# Patient Record
Sex: FEMALE | Race: WHITE | Employment: OTHER | URBAN - METROPOLITAN AREA
[De-identification: names, ages, dates, MRNs, and addresses within clinical notes are randomized per-mention and may not be internally consistent; named-entity substitution may affect disease eponyms.]

---

## 2018-12-19 ENCOUNTER — HOSPITAL ENCOUNTER (OUTPATIENT)
Dept: BONE DENSITY | Age: 68
Discharge: HOME | End: 2018-12-19
Attending: FAMILY MEDICINE
Payer: MEDICARE

## 2018-12-19 DIAGNOSIS — M81.0 OSTEOPOROSIS: ICD-10-CM

## 2018-12-19 PROCEDURE — 77080 DXA BONE DENSITY AXIAL: CPT

## 2019-04-15 ENCOUNTER — HOSPITAL ENCOUNTER (OUTPATIENT)
Dept: ULTRASOUND IMAGING | Age: 69
Discharge: HOME | End: 2019-04-15
Attending: FAMILY MEDICINE
Payer: MEDICARE

## 2019-04-15 DIAGNOSIS — R10.13 ABDOMINAL PAIN, EPIGASTRIC: ICD-10-CM

## 2019-04-15 PROCEDURE — 76700 US EXAM ABDOM COMPLETE: CPT

## 2019-04-23 ENCOUNTER — HOSPITAL ENCOUNTER (OUTPATIENT)
Dept: CT IMAGING | Age: 69
Discharge: HOME | End: 2019-04-23
Attending: FAMILY MEDICINE
Payer: MEDICARE

## 2019-04-23 DIAGNOSIS — K86.2 CYST OF PANCREAS: ICD-10-CM

## 2019-04-23 LAB — CREAT BLD-MCNC: 0.6 MG/DL (ref 0.6–1.3)

## 2019-04-23 PROCEDURE — 82565 ASSAY OF CREATININE: CPT

## 2019-04-23 PROCEDURE — 74170 CT ABD WO CNTRST FLWD CNTRST: CPT

## 2019-04-23 PROCEDURE — 74011636320 HC RX REV CODE- 636/320: Performed by: INTERNAL MEDICINE

## 2019-04-23 RX ORDER — SODIUM CHLORIDE 0.9 % (FLUSH) 0.9 %
10 SYRINGE (ML) INJECTION
Status: COMPLETED | OUTPATIENT
Start: 2019-04-23 | End: 2019-04-23

## 2019-04-23 RX ADMIN — IOPAMIDOL 100 ML: 755 INJECTION, SOLUTION INTRAVENOUS at 11:09

## 2019-04-23 RX ADMIN — Medication 10 ML: at 11:09

## 2019-05-28 ENCOUNTER — HOSPITAL ENCOUNTER (OUTPATIENT)
Dept: MRI IMAGING | Age: 69
Discharge: HOME | End: 2019-05-28
Attending: INTERNAL MEDICINE
Payer: MEDICARE

## 2019-05-28 DIAGNOSIS — K86.2 PANCREATIC CYST: ICD-10-CM

## 2019-05-28 PROCEDURE — A9585 GADOBUTROL INJECTION: HCPCS | Performed by: INTERNAL MEDICINE

## 2019-05-28 PROCEDURE — 74011250636 HC RX REV CODE- 250/636: Performed by: INTERNAL MEDICINE

## 2019-05-28 PROCEDURE — 74183 MRI ABD W/O CNTR FLWD CNTR: CPT

## 2019-05-28 RX ADMIN — GADOBUTROL 10 ML: 604.72 INJECTION INTRAVENOUS at 15:46

## 2020-05-24 ENCOUNTER — HOSPITAL ENCOUNTER (OUTPATIENT)
Dept: MRI IMAGING | Age: 70
Discharge: HOME | End: 2020-05-24
Attending: INTERNAL MEDICINE
Payer: MEDICARE

## 2020-05-24 DIAGNOSIS — K86.2 PANCREATIC CYST: ICD-10-CM

## 2020-05-24 PROCEDURE — A9585 GADOBUTROL INJECTION: HCPCS | Performed by: INTERNAL MEDICINE

## 2020-05-24 PROCEDURE — 74183 MRI ABD W/O CNTR FLWD CNTR: CPT

## 2020-05-24 PROCEDURE — 74011250636 HC RX REV CODE- 250/636: Performed by: INTERNAL MEDICINE

## 2020-05-24 RX ADMIN — GADOBUTROL 9 ML: 604.72 INJECTION INTRAVENOUS at 09:38

## 2020-12-10 ENCOUNTER — TRANSCRIBE ORDER (OUTPATIENT)
Dept: SCHEDULING | Age: 70
End: 2020-12-10

## 2020-12-10 DIAGNOSIS — M81.0 OSTEOPOROSIS: Primary | ICD-10-CM

## 2021-02-15 ENCOUNTER — HOSPITAL ENCOUNTER (OUTPATIENT)
Dept: BONE DENSITY | Age: 71
Discharge: HOME | End: 2021-02-15
Attending: FAMILY MEDICINE
Payer: MEDICARE

## 2021-02-15 DIAGNOSIS — M81.0 OSTEOPOROSIS: ICD-10-CM

## 2021-02-15 PROCEDURE — 77080 DXA BONE DENSITY AXIAL: CPT

## 2021-03-10 RX ORDER — ACETAMINOPHEN 325 MG/1
650 TABLET ORAL ONCE
Status: ACTIVE | OUTPATIENT
Start: 2021-03-17 | End: 2021-03-17

## 2021-03-10 RX ORDER — ZOLEDRONIC ACID 5 MG/100ML
5 INJECTION, SOLUTION INTRAVENOUS ONCE
Status: COMPLETED | OUTPATIENT
Start: 2021-03-17 | End: 2021-03-17

## 2021-03-17 ENCOUNTER — HOSPITAL ENCOUNTER (OUTPATIENT)
Dept: INFUSION THERAPY | Age: 71
Discharge: HOME | End: 2021-03-17
Payer: MEDICARE

## 2021-03-17 VITALS
DIASTOLIC BLOOD PRESSURE: 88 MMHG | OXYGEN SATURATION: 97 % | BODY MASS INDEX: 36.47 KG/M2 | SYSTOLIC BLOOD PRESSURE: 146 MMHG | TEMPERATURE: 97.2 F | WEIGHT: 202.6 LBS | RESPIRATION RATE: 16 BRPM | HEART RATE: 71 BPM

## 2021-03-17 LAB
ALBUMIN SERPL-MCNC: 3.6 G/DL (ref 3.5–5)
ANION GAP BLD CALC-SCNC: 14 MMOL/L (ref 10–20)
ANION GAP SERPL CALC-SCNC: 5 MMOL/L (ref 5–15)
BUN BLD-MCNC: 12 MG/DL (ref 9–20)
BUN SERPL-MCNC: 12 MG/DL (ref 6–20)
BUN/CREAT SERPL: 18 (ref 12–20)
CA-I BLD-MCNC: 1.1 MMOL/L (ref 1.12–1.32)
CALCIUM SERPL-MCNC: 8.7 MG/DL (ref 8.5–10.1)
CHLORIDE BLD-SCNC: 103 MMOL/L (ref 98–107)
CHLORIDE SERPL-SCNC: 105 MMOL/L (ref 97–108)
CO2 BLD-SCNC: 27 MMOL/L (ref 21–32)
CO2 SERPL-SCNC: 27 MMOL/L (ref 21–32)
CREAT BLD-MCNC: 0.7 MG/DL (ref 0.6–1.3)
CREAT SERPL-MCNC: 0.67 MG/DL (ref 0.55–1.02)
GLUCOSE BLD-MCNC: 115 MG/DL (ref 65–100)
GLUCOSE SERPL-MCNC: 112 MG/DL (ref 65–100)
HCT VFR BLD CALC: 40 % (ref 35–47)
PHOSPHATE SERPL-MCNC: 3.5 MG/DL (ref 2.6–4.7)
POTASSIUM BLD-SCNC: 4.1 MMOL/L (ref 3.5–5.1)
POTASSIUM SERPL-SCNC: 3.8 MMOL/L (ref 3.5–5.1)
SERVICE CMNT-IMP: ABNORMAL
SODIUM BLD-SCNC: 139 MMOL/L (ref 136–145)
SODIUM SERPL-SCNC: 137 MMOL/L (ref 136–145)

## 2021-03-17 PROCEDURE — 36415 COLL VENOUS BLD VENIPUNCTURE: CPT

## 2021-03-17 PROCEDURE — 74011250636 HC RX REV CODE- 250/636: Performed by: FAMILY MEDICINE

## 2021-03-17 PROCEDURE — 80047 BASIC METABLC PNL IONIZED CA: CPT

## 2021-03-17 PROCEDURE — 80069 RENAL FUNCTION PANEL: CPT

## 2021-03-17 PROCEDURE — 96374 THER/PROPH/DIAG INJ IV PUSH: CPT

## 2021-03-17 RX ADMIN — ZOLEDRONIC ACID 5 MG: 5 INJECTION, SOLUTION INTRAVENOUS at 15:58

## 2021-03-17 NOTE — PROGRESS NOTES
[facility] Short Visit Progress Note    1430 Patient admitted to [facility] for [address] ambulatory in stable condition. Assessment completed. No new concerns voiced. Covid Screening      1. Do you have any symptoms of COVID-19? SOB, coughing, fever, or generally not feeling well ? NO  2. Have you been exposed to COVID-19 recently? NO  3. Have you had any recent contact with family/friend that has a pending COVID test? NO    Vital Signs:  Patient Vitals for the past 12 hrs:   Temp Pulse Resp BP SpO2   03/17/21 1438 97.2 °F (36.2 °C) 71 16 (!) 146/88 97 %         Peripheral IV 03/17/21 Right Antecubital (Active)   Site Assessment Clean, dry, & intact 03/17/21 1430   Phlebitis Assessment 0 03/17/21 1430   Infiltration Assessment 0 03/17/21 1430   Dressing Status New 03/17/21 1430   Dressing Type Transparent 03/17/21 1430   Hub Color/Line Status Yellow; Flushed; Infusing 03/17/21 1430   Action Taken Blood drawn 03/17/21 1430   Alcohol Cap Used Yes 03/17/21 1430        Lab Results:  Recent Results (from the past 12 hour(s))   POC CHEM8    Collection Time: 03/17/21  2:52 PM   Result Value Ref Range    Calcium, ionized (POC) 1.10 (L) 1.12 - 1.32 mmol/L    Sodium (POC) 139 136 - 145 mmol/L    Potassium (POC) 4.1 3.5 - 5.1 mmol/L    Chloride (POC) 103 98 - 107 mmol/L    CO2 (POC) 27 21 - 32 mmol/L    Anion gap (POC) 14 10 - 20 mmol/L    Glucose (POC) 115 (H) 65 - 100 mg/dL    BUN (POC) 12 9 - 20 mg/dL    Creatinine (POC) 0.7 0.6 - 1.3 mg/dL    GFRAA, POC >60 >60 ml/min/1.73m2    GFRNA, POC >60 >60 ml/min/1.73m2    Hematocrit (POC) 40 35.0 - 47.0 %    Comment Comment Not Indicated.      RENAL FUNCTION PANEL    Collection Time: 03/17/21  2:59 PM   Result Value Ref Range    Sodium 137 136 - 145 mmol/L    Potassium 3.8 3.5 - 5.1 mmol/L    Chloride 105 97 - 108 mmol/L    CO2 27 21 - 32 mmol/L    Anion gap 5 5 - 15 mmol/L    Glucose 112 (H) 65 - 100 mg/dL    BUN 12 6 - 20 MG/DL    Creatinine 0.67 0.55 - 1.02 MG/DL BUN/Creatinine ratio 18 12 - 20      GFR est AA >60 >60 ml/min/1.73m2    GFR est non-AA >60 >60 ml/min/1.73m2    Calcium 8.7 8.5 - 10.1 MG/DL    Phosphorus 3.5 2.6 - 4.7 MG/DL    Albumin 3.6 3.5 - 5.0 g/dL     Patient's labs within parameters for treatment. Medications:  Medications Administered     zoledronic acid (RECLAST) IVPB 5 mg     Admin Date  03/17/2021 Action  Given Dose  5 mg Rate  400 mL/hr Route  IntraVENous Administered By  Dariela Cotton              Patient's labs within parameters for treatment. Patient given educations on medication. Patient opted to take Tylenol when home. Patient tolerated treatment well. Patient discharged from [facility] ambulatory in no distress at 1630. Patient aware of next appointment. No future appointments.

## 2021-06-10 ENCOUNTER — TRANSCRIBE ORDER (OUTPATIENT)
Dept: SCHEDULING | Age: 71
End: 2021-06-10

## 2021-06-10 DIAGNOSIS — K86.2 PANCREATIC CYST: Primary | ICD-10-CM

## 2021-06-26 ENCOUNTER — HOSPITAL ENCOUNTER (OUTPATIENT)
Dept: MRI IMAGING | Age: 71
Discharge: HOME | End: 2021-06-26
Attending: INTERNAL MEDICINE
Payer: MEDICARE

## 2021-06-26 DIAGNOSIS — K86.2 PANCREATIC CYST: ICD-10-CM

## 2021-06-26 PROCEDURE — 74181 MRI ABDOMEN W/O CONTRAST: CPT

## 2023-05-22 RX ORDER — LORAZEPAM 1 MG/1
0.5 TABLET ORAL 2 TIMES DAILY PRN
COMMUNITY

## 2023-05-22 RX ORDER — ACETAMINOPHEN 325 MG/1
650 TABLET ORAL EVERY 6 HOURS PRN
COMMUNITY
Start: 2015-05-05

## 2023-05-22 RX ORDER — LORATADINE 10 MG/1
10 TABLET ORAL DAILY
COMMUNITY
Start: 2015-05-06

## 2023-05-22 RX ORDER — FLUOXETINE HYDROCHLORIDE 40 MG/1
40 CAPSULE ORAL NIGHTLY
COMMUNITY

## 2023-05-22 RX ORDER — CYANOCOBALAMIN 1000 UG/ML
1000 INJECTION, SOLUTION INTRAMUSCULAR; SUBCUTANEOUS
COMMUNITY

## 2024-01-31 ENCOUNTER — OFFICE VISIT (OUTPATIENT)
Age: 74
End: 2024-01-31
Payer: MEDICARE

## 2024-01-31 VITALS
DIASTOLIC BLOOD PRESSURE: 80 MMHG | SYSTOLIC BLOOD PRESSURE: 130 MMHG | HEART RATE: 66 BPM | TEMPERATURE: 97.5 F | RESPIRATION RATE: 16 BRPM | BODY MASS INDEX: 37.06 KG/M2 | OXYGEN SATURATION: 98 % | HEIGHT: 62 IN

## 2024-01-31 DIAGNOSIS — R41.3 MEMORY DISTURBANCE: Primary | ICD-10-CM

## 2024-01-31 DIAGNOSIS — R41.3 MEMORY DISTURBANCE: ICD-10-CM

## 2024-01-31 PROCEDURE — 1090F PRES/ABSN URINE INCON ASSESS: CPT | Performed by: PSYCHIATRY & NEUROLOGY

## 2024-01-31 PROCEDURE — G8427 DOCREV CUR MEDS BY ELIG CLIN: HCPCS | Performed by: PSYCHIATRY & NEUROLOGY

## 2024-01-31 PROCEDURE — G8399 PT W/DXA RESULTS DOCUMENT: HCPCS | Performed by: PSYCHIATRY & NEUROLOGY

## 2024-01-31 PROCEDURE — G8417 CALC BMI ABV UP PARAM F/U: HCPCS | Performed by: PSYCHIATRY & NEUROLOGY

## 2024-01-31 PROCEDURE — 1123F ACP DISCUSS/DSCN MKR DOCD: CPT | Performed by: PSYCHIATRY & NEUROLOGY

## 2024-01-31 PROCEDURE — G8484 FLU IMMUNIZE NO ADMIN: HCPCS | Performed by: PSYCHIATRY & NEUROLOGY

## 2024-01-31 PROCEDURE — 99205 OFFICE O/P NEW HI 60 MIN: CPT | Performed by: PSYCHIATRY & NEUROLOGY

## 2024-01-31 PROCEDURE — 1036F TOBACCO NON-USER: CPT | Performed by: PSYCHIATRY & NEUROLOGY

## 2024-01-31 RX ORDER — MECLIZINE HYDROCHLORIDE 25 MG/1
25 TABLET ORAL 3 TIMES DAILY PRN
COMMUNITY

## 2024-01-31 RX ORDER — BUPROPION HYDROCHLORIDE 150 MG/1
TABLET ORAL
COMMUNITY
Start: 2024-01-08

## 2024-01-31 RX ORDER — ZOLPIDEM TARTRATE 5 MG/1
TABLET ORAL
COMMUNITY
Start: 2024-01-14

## 2024-01-31 RX ORDER — DONEPEZIL HYDROCHLORIDE 10 MG/1
10 TABLET, FILM COATED ORAL NIGHTLY
COMMUNITY

## 2024-01-31 RX ORDER — MEMANTINE HYDROCHLORIDE 10 MG/1
10 TABLET ORAL 2 TIMES DAILY
COMMUNITY
Start: 2024-01-04

## 2024-01-31 RX ORDER — BUSPIRONE HYDROCHLORIDE 10 MG/1
10 TABLET ORAL 2 TIMES DAILY
COMMUNITY

## 2024-01-31 ASSESSMENT — PATIENT HEALTH QUESTIONNAIRE - PHQ9
SUM OF ALL RESPONSES TO PHQ QUESTIONS 1-9: 0
SUM OF ALL RESPONSES TO PHQ9 QUESTIONS 1 & 2: 0
SUM OF ALL RESPONSES TO PHQ QUESTIONS 1-9: 0
1. LITTLE INTEREST OR PLEASURE IN DOING THINGS: 0
SUM OF ALL RESPONSES TO PHQ QUESTIONS 1-9: 0
2. FEELING DOWN, DEPRESSED OR HOPELESS: 0
SUM OF ALL RESPONSES TO PHQ QUESTIONS 1-9: 0

## 2024-01-31 NOTE — PATIENT INSTRUCTIONS
As per discussion  I agree with you being on the Aricept and the Namenda at this time we need to order some test MRI scan and blood work and if you get online with my chart I will go over those briefly with you and anything differently we need to do based on those results if you are not online I will send you a letter regarding that.    We also can get you in for the neuropsych testing with Dr. Lopez and I will see you back after everything has been completed so we can discuss the next steps                Office Policies    Phone calls/patient messages:  Please allow up to 24 hours for someone in the office to contact you about your call or message. Be mindful your provider may be out of the office or your message may require further review. We encourage you to use GoingOn for your messages as this is a faster, more efficient way to communicate with our office    Medication Refills:  Prescription medications require up to 48 business hours to process. We encourage you to use GoingOn for your refills.     For controlled medications: Please allow up to 72 business hours to process. Certain medications may require you to  a written prescription at our office.    NO narcotic/controlled medications will be prescribed after 4pm Monday through Friday or on weekends    Form/Paperwork Completion:  We ask that you allow 7-14 business days. You may also download your forms to GoingOn to have your doctor print off.

## 2024-01-31 NOTE — PROGRESS NOTES
[facility]  In Office NEW Pt VISIT         Lelo Kaiser is a 74 y.o. female who presents today for the following:  Chief Complaint   Patient presents with    Memory Loss     Per PCP, patient has a mild case of dementia, short term memory loss         ASSESSMENT AND PLAN    1. Memory disturbance  Assessment & Plan:  Patient is currently on Aricept 10 mg a day and Namenda 10 mg twice a day.   Neuropsych testing is pending and is scheduled for 3/6/2024  Will check MRI scan of the brain as well as blood work to see if there are any contributory factors related to her cognitive issue    Patient is to continue activity as tolerated  Agree with living arrangements and I agree that the patient not driving  And I agree with the family involvement regarding finances and medication management   Orders:  -     MRI BRAIN W WO CONTRAST; Future  -     CBC with Auto Differential; Future  -     Comprehensive Metabolic Panel; Future  -     TSH; Future        Return in about 4 months (around 5/31/2024) for In office, Or next available after that time.     Patient and/or family was given time to ask questions and voice concerns. I believe all questions concerns were adequately addressed at this  office visit.  Patient and/or family also verbalized agreement and understanding of the above-stated plan    Complex neurologic decision making secondary any or all of the following to include unclear etiology, and /or polypharmacy, and/or significant comorbid conditions, and/or use of high-risk medications which complicate the decision making process related to patient's neurologic diagnosis      ICD-10-CM    1. Memory disturbance  R41.3 MRI BRAIN W WO CONTRAST     CBC with Auto Differential     Comprehensive Metabolic Panel     TSH             I attest that 60 minutes was spent on today's visit reviewing medical records and diagnostic testing deemed pertinent to this patient's care, along with direct time

## 2024-01-31 NOTE — ASSESSMENT & PLAN NOTE
Patient is currently on Aricept 10 mg a day and Namenda 10 mg twice a day.   Neuropsych testing is pending and is scheduled for 3/6/2024  Will check MRI scan of the brain as well as blood work to see if there are any contributory factors related to her cognitive issue    Patient is to continue activity as tolerated  Agree with living arrangements and I agree that the patient not driving  And I agree with the family involvement regarding finances and medication management

## 2024-02-01 LAB
ALBUMIN SERPL-MCNC: 3.7 G/DL (ref 3.5–5)
ALBUMIN/GLOB SERPL: 1.3 (ref 1.1–2.2)
ALP SERPL-CCNC: 90 U/L (ref 45–117)
ALT SERPL-CCNC: 19 U/L (ref 12–78)
ANION GAP SERPL CALC-SCNC: 6 MMOL/L (ref 5–15)
AST SERPL-CCNC: 15 U/L (ref 15–37)
BASOPHILS # BLD: 0 K/UL (ref 0–0.1)
BASOPHILS NFR BLD: 1 % (ref 0–1)
BILIRUB SERPL-MCNC: 0.7 MG/DL (ref 0.2–1)
BUN SERPL-MCNC: 13 MG/DL (ref 6–20)
BUN/CREAT SERPL: 19 (ref 12–20)
CALCIUM SERPL-MCNC: 8.9 MG/DL (ref 8.5–10.1)
CHLORIDE SERPL-SCNC: 108 MMOL/L (ref 97–108)
CO2 SERPL-SCNC: 26 MMOL/L (ref 21–32)
CREAT SERPL-MCNC: 0.67 MG/DL (ref 0.55–1.02)
DIFFERENTIAL METHOD BLD: ABNORMAL
EOSINOPHIL # BLD: 0.1 K/UL (ref 0–0.4)
EOSINOPHIL NFR BLD: 2 % (ref 0–7)
ERYTHROCYTE [DISTWIDTH] IN BLOOD BY AUTOMATED COUNT: 14.3 % (ref 11.5–14.5)
GLOBULIN SER CALC-MCNC: 2.8 G/DL (ref 2–4)
GLUCOSE SERPL-MCNC: 111 MG/DL (ref 65–100)
HCT VFR BLD AUTO: 41.8 % (ref 35–47)
HGB BLD-MCNC: 12.7 G/DL (ref 11.5–16)
IMM GRANULOCYTES # BLD AUTO: 0 K/UL (ref 0–0.04)
IMM GRANULOCYTES NFR BLD AUTO: 0 % (ref 0–0.5)
LYMPHOCYTES # BLD: 1.1 K/UL (ref 0.8–3.5)
LYMPHOCYTES NFR BLD: 17 % (ref 12–49)
MCH RBC QN AUTO: 24.6 PG (ref 26–34)
MCHC RBC AUTO-ENTMCNC: 30.4 G/DL (ref 30–36.5)
MCV RBC AUTO: 81 FL (ref 80–99)
MONOCYTES # BLD: 0.9 K/UL (ref 0–1)
MONOCYTES NFR BLD: 14 % (ref 5–13)
NEUTS SEG # BLD: 4.4 K/UL (ref 1.8–8)
NEUTS SEG NFR BLD: 66 % (ref 32–75)
NRBC # BLD: 0 K/UL (ref 0–0.01)
NRBC BLD-RTO: 0 PER 100 WBC
PLATELET # BLD AUTO: 181 K/UL (ref 150–400)
PMV BLD AUTO: 12.1 FL (ref 8.9–12.9)
POTASSIUM SERPL-SCNC: 4 MMOL/L (ref 3.5–5.1)
PROT SERPL-MCNC: 6.5 G/DL (ref 6.4–8.2)
RBC # BLD AUTO: 5.16 M/UL (ref 3.8–5.2)
SODIUM SERPL-SCNC: 140 MMOL/L (ref 136–145)
TSH SERPL DL<=0.05 MIU/L-ACNC: 0.86 UIU/ML (ref 0.36–3.74)
WBC # BLD AUTO: 6.6 K/UL (ref 3.6–11)

## 2024-02-14 ENCOUNTER — HOSPITAL ENCOUNTER (OUTPATIENT)
Age: 74
Discharge: HOME | End: 2024-02-17
Payer: MEDICARE

## 2024-02-14 DIAGNOSIS — R41.3 MEMORY DISTURBANCE: ICD-10-CM

## 2024-02-14 DIAGNOSIS — M81.0 SENILE OSTEOPOROSIS: ICD-10-CM

## 2024-02-14 PROCEDURE — 6360000004 HC RX CONTRAST MEDICATION: Performed by: RADIOLOGY

## 2024-02-14 PROCEDURE — A9579 GAD-BASE MR CONTRAST NOS,1ML: HCPCS | Performed by: RADIOLOGY

## 2024-02-14 PROCEDURE — 77080 DXA BONE DENSITY AXIAL: CPT

## 2024-02-14 PROCEDURE — 70553 MRI BRAIN STEM W/O & W/DYE: CPT

## 2024-02-14 RX ADMIN — GADOTERIDOL 14 ML: 279.3 INJECTION, SOLUTION INTRAVENOUS at 10:55

## 2024-02-27 PROBLEM — M81.8 OTHER OSTEOPOROSIS WITHOUT CURRENT PATHOLOGICAL FRACTURE: Status: ACTIVE | Noted: 2024-02-27

## 2024-03-05 PROBLEM — M81.0 OSTEOPOROSIS: Status: ACTIVE | Noted: 2024-02-27

## 2024-03-05 NOTE — PROGRESS NOTES
has not had any alcohol in the past year.  She did not have history of heavy consistent alcohol consumption.  Nicotine: Quit many years ago  Recreational/Illicit Substances: History of cannabis use.  Quit several decades ago.  Treatment: None    BEHAVIORAL OBSERVATIONS:  Appearance: Casually dressed, Well-groomed, and Appeared stated age  Orientation: Person, Place, Time, and Situation  Motor: Ambulated independently, Adequate gait, Adequate posture, No involuntary movements, and Adequate strength  Thought Processes: Clear, Coherent, Logical, and Organized  Hearing and Vision: Adequate  Speech: Appropriate for Rate, Tone, Prosody, and Volume  Comprehension: Adequate  Interpersonal Skills: Adequate  Affect: Appropriate  Ability as Historian: Fair  Insight: Fair  Judgment: Fair    STRENGTHS:  Exercising self-direction/Resourceful, Access to housing/residential stability, and Interpersonal/supportive relationships (family, friends, peers)    WEAKNESSES:  Health problems and Cognitive limitations    IMPRESSION:  The patient is a 74-year-old female who presents for neuropsychological evaluation secondary concerns regarding his cognitive functioning.  The extent of her cognitive weaknesses and the cause requires further clarification.  Comprehensive evaluation will assist with obtaining a quantitative assessment of the patient's cognitive and emotional functioning.  She is currently scheduled for testing on 3/7/2024 and report will be attached to that encounter.    ASSESSMENT:  Memory loss  Generalized anxiety disorder    PLAN:  Patient will participate in comprehensive evaluation in order to obtain a quantitative assessment of their cognitive and emotional functioning  Differential diagnosis and treatment planning will be based upon results from clinical interview and objective testing  Patient will be provided with review of results, impressions, and recommendations during feedback session  Patient will be encouraged to

## 2024-03-06 ENCOUNTER — OFFICE VISIT (OUTPATIENT)
Age: 74
End: 2024-03-06
Payer: MEDICARE

## 2024-03-06 DIAGNOSIS — F41.1 GENERALIZED ANXIETY DISORDER: Primary | ICD-10-CM

## 2024-03-06 DIAGNOSIS — R41.3 MEMORY LOSS: ICD-10-CM

## 2024-03-06 PROCEDURE — 1036F TOBACCO NON-USER: CPT | Performed by: CLINICAL NEUROPSYCHOLOGIST

## 2024-03-06 PROCEDURE — 90791 PSYCH DIAGNOSTIC EVALUATION: CPT | Performed by: CLINICAL NEUROPSYCHOLOGIST

## 2024-03-06 PROCEDURE — 90785 PSYTX COMPLEX INTERACTIVE: CPT | Performed by: CLINICAL NEUROPSYCHOLOGIST

## 2024-03-06 PROCEDURE — 1123F ACP DISCUSS/DSCN MKR DOCD: CPT | Performed by: CLINICAL NEUROPSYCHOLOGIST

## 2024-03-07 ENCOUNTER — PROCEDURE VISIT (OUTPATIENT)
Age: 74
End: 2024-03-07
Payer: MEDICARE

## 2024-03-07 DIAGNOSIS — F03.A4 MILD DEMENTIA WITH ANXIETY, UNSPECIFIED DEMENTIA TYPE (HCC): Primary | ICD-10-CM

## 2024-03-07 DIAGNOSIS — R45.4 IRRITABILITY: ICD-10-CM

## 2024-03-07 DIAGNOSIS — R41.3 MEMORY LOSS: ICD-10-CM

## 2024-03-07 PROCEDURE — 96132 NRPSYC TST EVAL PHYS/QHP 1ST: CPT | Performed by: CLINICAL NEUROPSYCHOLOGIST

## 2024-03-07 PROCEDURE — 96138 PSYCL/NRPSYC TECH 1ST: CPT | Performed by: CLINICAL NEUROPSYCHOLOGIST

## 2024-03-07 PROCEDURE — 96139 PSYCL/NRPSYC TST TECH EA: CPT | Performed by: CLINICAL NEUROPSYCHOLOGIST

## 2024-03-07 PROCEDURE — 96133 NRPSYC TST EVAL PHYS/QHP EA: CPT | Performed by: CLINICAL NEUROPSYCHOLOGIST

## 2024-03-12 ENCOUNTER — HOSPITAL ENCOUNTER (OUTPATIENT)
Facility: HOSPITAL | Age: 74
Setting detail: INFUSION SERIES
Discharge: HOME | End: 2024-03-12
Payer: MEDICARE

## 2024-03-12 VITALS
SYSTOLIC BLOOD PRESSURE: 118 MMHG | WEIGHT: 160.5 LBS | RESPIRATION RATE: 20 BRPM | BODY MASS INDEX: 29.35 KG/M2 | DIASTOLIC BLOOD PRESSURE: 66 MMHG | HEART RATE: 59 BPM | TEMPERATURE: 98.3 F

## 2024-03-12 DIAGNOSIS — M81.8 OTHER OSTEOPOROSIS WITHOUT CURRENT PATHOLOGICAL FRACTURE: Primary | ICD-10-CM

## 2024-03-12 LAB
ANION GAP BLD CALC-SCNC: 9.3 MMOL/L (ref 10–20)
CA-I BLD-MCNC: 1.17 MMOL/L (ref 1.12–1.32)
CHLORIDE BLD-SCNC: 106 MMOL/L (ref 98–107)
CO2 BLD-SCNC: 25.7 MMOL/L (ref 21–32)
CREAT BLD-MCNC: 0.51 MG/DL (ref 0.6–1.3)
GLUCOSE BLD-MCNC: 113 MG/DL (ref 65–100)
POTASSIUM BLD-SCNC: 4 MMOL/L (ref 3.5–5.1)
SERVICE CMNT-IMP: ABNORMAL
SODIUM BLD-SCNC: 141 MMOL/L (ref 136–145)

## 2024-03-12 PROCEDURE — 96374 THER/PROPH/DIAG INJ IV PUSH: CPT

## 2024-03-12 PROCEDURE — 6360000002 HC RX W HCPCS: Performed by: FAMILY MEDICINE

## 2024-03-12 PROCEDURE — 80047 BASIC METABLC PNL IONIZED CA: CPT

## 2024-03-12 PROCEDURE — 2580000003 HC RX 258: Performed by: FAMILY MEDICINE

## 2024-03-12 RX ORDER — ZOLEDRONIC ACID 5 MG/100ML
5 INJECTION, SOLUTION INTRAVENOUS ONCE
Status: CANCELLED | OUTPATIENT
Start: 2024-03-12 | End: 2024-03-12

## 2024-03-12 RX ORDER — SODIUM CHLORIDE 9 MG/ML
5-250 INJECTION, SOLUTION INTRAVENOUS PRN
Status: DISCONTINUED | OUTPATIENT
Start: 2024-03-12 | End: 2024-03-13 | Stop reason: HOSPADM

## 2024-03-12 RX ORDER — SODIUM CHLORIDE 9 MG/ML
5-250 INJECTION, SOLUTION INTRAVENOUS PRN
Status: CANCELLED | OUTPATIENT
Start: 2024-03-12

## 2024-03-12 RX ORDER — ZOLEDRONIC ACID 5 MG/100ML
5 INJECTION, SOLUTION INTRAVENOUS ONCE
Status: COMPLETED | OUTPATIENT
Start: 2024-03-12 | End: 2024-03-12

## 2024-03-12 RX ADMIN — ZOLEDRONIC ACID 5 MG: 5 INJECTION, SOLUTION INTRAVENOUS at 14:50

## 2024-03-12 RX ADMIN — SODIUM CHLORIDE 25 ML/HR: 9 INJECTION, SOLUTION INTRAVENOUS at 14:30

## 2024-03-12 ASSESSMENT — PAIN SCALES - GENERAL: PAINLEVEL_OUTOF10: 0

## 2024-03-12 NOTE — PROGRESS NOTES
[facility] P[facility]/Adult Note                   Date: 2024    Name: Lelo Kaiser    MRN: [id number]         : 1950    1405 - Patient arrives for yearly Reclast without acute problems. Please see Epic for complete assessment and education provided.    Vital signs stable throughout and prior to discharge. Patient tolerated procedure well and was discharged without incident. Patient and Family member is aware of  no upcoming [facility] appointments and to contact health provider.      Ms. Kaiser's vitals were reviewed prior to and after treatment.   Patient Vitals for the past 12 hrs:   Temp Pulse Resp BP   24 1507 -- 59 20 118/66   24 1405 98.3 °F (36.8 °C) 67 20 137/67       Lab results were obtained and reviewed.  Recent Results (from the past 12 hour(s))   POC CHEM 8    Collection Time: 24  2:18 PM   Result Value Ref Range    POC Ionized Calcium 1.17 1.12 - 1.32 mmol/L    POC Sodium 141 136 - 145 mmol/L    POC Potassium 4.0 3.5 - 5.1 mmol/L    POC Chloride 106 98 - 107 mmol/L    POC TCO2 25.7 21 - 32 mmol/L    Anion Gap, POC 9.3 (L) 10 - 20 mmol/L    POC Glucose 113 (H) 65 - 100 mg/dL    POC Creatinine 0.51 (L) 0.6 - 1.3 mg/dL    eGFR, POC >60 >60 ml/min/1.73m2    UA Comment Comment Not Indicated.         Medications given:   Medications Administered         0.9 % sodium chloride infusion Admin Date  2024 Action  New Bag Dose  25 mL/hr Rate  25 mL/hr Route  IntraVENous Administered By  Charlee Lopez, RN        zoledronic acid (RECLAST) 5 mg/100 mL infusion Admin Date  2024 Action  New Bag Dose  5 mg Rate  400 mL/hr Route  IntraVENous Administered By  Charlee Lopez, RN            Ms. Kaiser tolerated the infusion, and had no complaints.    Ms. Kaiser was discharged from [facility] in stable condition. Discharge Instructions provided to patient, patient verbalized understanding but denied the request for a copy of after visit summary.     Future Appointments   Date

## 2024-03-19 NOTE — PROGRESS NOTES
average  Functional:   Pillbox organization: Failed  Bill pay: Exceptionally low  Practical judgment: Average  Quantitative collateral reported declines in daily functioning: The patient's family reported the presence of moderate declines (greater than 50% decline) and activities related to employment/recreation, shopping/financial management, and travel.  They also reported milder declines and activities related to household tasks, self-care, and communication.  Psychiatric/Sleep:   Depression: Within normal limits  Anxiety: Within normal limits  Daytime sleepiness: Within normal limits  Sleep quality: Within normal limits    TIME:  Clinical Interview: 8890 - 1445 = 25 minutes  Testing by technician: 6318 - 1026 = 144 minutes  Scoring by technician: 49 minutes  Neuropsychological testing evaluation services*: 162 minutes    BILLING:  90791 x 1 Unit  96138 x 1 Unit  96139 x 5 Units  96132 x 1 Unit  96133 x 2 Units    *Neuropsychological testing evaluation services include: Integration of patient data, interpretation of standardized test results and clinical data, clinical decision-making, treatment planning and report, and interactive feedback to the patient, family member(s) or caregiver(s), when performed.

## 2024-03-20 ENCOUNTER — OFFICE VISIT (OUTPATIENT)
Age: 74
End: 2024-03-20
Payer: MEDICARE

## 2024-03-20 DIAGNOSIS — F03.A4 MILD DEMENTIA WITH ANXIETY, UNSPECIFIED DEMENTIA TYPE (HCC): Primary | ICD-10-CM

## 2024-03-20 DIAGNOSIS — R45.4 IRRITABILITY: ICD-10-CM

## 2024-03-20 PROCEDURE — 96132 NRPSYC TST EVAL PHYS/QHP 1ST: CPT | Performed by: CLINICAL NEUROPSYCHOLOGIST

## 2024-03-20 NOTE — PROGRESS NOTES
Prior to seeing the patient for today's visit, I reviewed pertinent records, including the previously completed report, the records in Epic, and any updated visits from other providers since the patient's last visit.    I provided feedback services related to the previously completed report. Attendees included: Patient and Children. Education was provided regarding my diagnostic impressions, and we discussed treatment plan/options. Attendees were provided with the opportunity to ask questions, which were answered to the best of my ability.    We discussed, in detail, the following:  Reviewed findings from evaluation including test results, diagnosis, and suspected contributing factors  Discussed recommendations outlined in report  Answered questions to the best of my ability    The patient needs to:   Follow up with referring provider for ongoing management, Use practical strategies to compensate for cognitive weaknesses (See handout), Emphasize modifiable risk and protective factors for cognitive functioning (e.g., exercise, diet, cognitive stimulation; see handout), Access community resources we discussed for additional support (See handout), and Follow up in 12 months    The patient had the following reactions to recommendations: Patient and daughter reported understanding of results.  They appeared motivated to engage in modifiable protective behaviors for cognitive functioning.    MENTAL STATUS:  Appearance: Casually dressed, Well-groomed, and Appeared stated age  Orientation: Not oriented to time (reporting the date to be 4/23/2003).  She was oriented to person and place but not circumstance.  Motor: Ambulated independently, Adequate gait, Adequate posture, No involuntary movements, and Adequate strength  Thought Processes: Clear and Coherent  Hearing and Vision: Adequate  Speech: Appropriate for Rate, Tone, Prosody, and Volume  Comprehension: Adequate  Interpersonal Skills: Adequate  Affect:

## 2024-04-18 ENCOUNTER — PATIENT MESSAGE (OUTPATIENT)
Age: 74
End: 2024-04-18

## 2024-04-19 DIAGNOSIS — R41.3 MEMORY LOSS: Primary | ICD-10-CM

## 2024-04-25 LAB — VIT B12 SERPL-MCNC: 600 PG/ML (ref 232–1245)

## 2024-04-28 LAB — PYRIDOXAL PHOS SERPL-MCNC: 7.2 UG/L (ref 3.4–65.2)

## 2024-04-29 LAB — VIT B1 BLD-SCNC: 87.5 NMOL/L (ref 66.5–200)

## 2024-05-15 ENCOUNTER — OFFICE VISIT (OUTPATIENT)
Age: 74
End: 2024-05-15
Payer: MEDICARE

## 2024-05-15 VITALS
OXYGEN SATURATION: 99 % | BODY MASS INDEX: 30.07 KG/M2 | RESPIRATION RATE: 16 BRPM | HEIGHT: 62 IN | SYSTOLIC BLOOD PRESSURE: 122 MMHG | HEART RATE: 59 BPM | TEMPERATURE: 97.9 F | WEIGHT: 163.4 LBS | DIASTOLIC BLOOD PRESSURE: 60 MMHG

## 2024-05-15 DIAGNOSIS — F03.A0 MILD DEMENTIA WITHOUT BEHAVIORAL DISTURBANCE, PSYCHOTIC DISTURBANCE, MOOD DISTURBANCE, OR ANXIETY, UNSPECIFIED DEMENTIA TYPE (HCC): Primary | ICD-10-CM

## 2024-05-15 PROCEDURE — 1123F ACP DISCUSS/DSCN MKR DOCD: CPT | Performed by: PSYCHIATRY & NEUROLOGY

## 2024-05-15 PROCEDURE — G8427 DOCREV CUR MEDS BY ELIG CLIN: HCPCS | Performed by: PSYCHIATRY & NEUROLOGY

## 2024-05-15 PROCEDURE — 1090F PRES/ABSN URINE INCON ASSESS: CPT | Performed by: PSYCHIATRY & NEUROLOGY

## 2024-05-15 PROCEDURE — G8417 CALC BMI ABV UP PARAM F/U: HCPCS | Performed by: PSYCHIATRY & NEUROLOGY

## 2024-05-15 PROCEDURE — G8399 PT W/DXA RESULTS DOCUMENT: HCPCS | Performed by: PSYCHIATRY & NEUROLOGY

## 2024-05-15 PROCEDURE — 99215 OFFICE O/P EST HI 40 MIN: CPT | Performed by: PSYCHIATRY & NEUROLOGY

## 2024-05-15 PROCEDURE — 1036F TOBACCO NON-USER: CPT | Performed by: PSYCHIATRY & NEUROLOGY

## 2024-05-15 RX ORDER — CYANOCOBALAMIN 1000 UG/ML
INJECTION, SOLUTION INTRAMUSCULAR; SUBCUTANEOUS
COMMUNITY
Start: 2024-03-11

## 2024-05-15 ASSESSMENT — PATIENT HEALTH QUESTIONNAIRE - PHQ9
1. LITTLE INTEREST OR PLEASURE IN DOING THINGS: NOT AT ALL
SUM OF ALL RESPONSES TO PHQ9 QUESTIONS 1 & 2: 0
SUM OF ALL RESPONSES TO PHQ QUESTIONS 1-9: 0
2. FEELING DOWN, DEPRESSED OR HOPELESS: NOT AT ALL
SUM OF ALL RESPONSES TO PHQ QUESTIONS 1-9: 0
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
## 2024-05-15 NOTE — PATIENT INSTRUCTIONS
As a reminder:   Please come to your appointment 15 minutes before your office appointment.  This way, you can get checked in at the  and checked in by the nursing staff so you have the full allotment of time with your provider for your visit.  Please bring an up-to-date and accurate list of all your medications.  Or bring all your active prescription bottles with you at the time of your office visit and this includes over-the-counter medications so we can make sure that your medication list is up-to-date.  If you are scheduled for a virtual visit, please be aware that the  will need to check you in and usually the day before to verify insurance and collect co-pays as appropriate.  Please be prepared for the second call which will be from the nurse to go over your medications and any other vital information.  This will probably be done 30 minutes prior to your visit.  The reason why we do this early is that you can get the full benefit of your appointment time with your provider.  Finally you will be given the link for your virtual visit please click into your link 10 minutes prior to your appointment and please wait patiently for the provider to join you        As per discussion  We will get you set up an appointment to get the skin biopsy done to give us a better determination of what type of memory problems were dealing with but for right now continue on your current medications    When the skin biopsy comes back if it looks more like Lewy body dementia we can talk about possibly switching the donepezil to the Exelon but it is nothing that needs to be done immediately remember memory issues and dementia is a marathon it is not a sprint                      Office Policies    Phone calls/patient messages:  Please allow up to 72 hours  for someone in the office to contact you about your call or message. Be mindful your provider may be out of the office or your message may require further review.

## 2024-05-15 NOTE — ASSESSMENT & PLAN NOTE
Overall stable continue on donepezil and Namenda  Diagnosis of dementia very clearly supported by neuropsych testing but unclear versus AD versus Lewy body dementia  Patient assessed for possible alcohol related dementia.  Patient does not drink alcohol and never did drink significant alcohol per daughter's report.  Thiamine level well within normal limits    Will check alpha-synuclein skin biopsy.  Pending results may need to consider amyloid PET scan

## 2024-06-07 ENCOUNTER — PROCEDURE VISIT (OUTPATIENT)
Age: 74
End: 2024-06-07

## 2024-06-07 DIAGNOSIS — G31.83 LEWY BODY DEMENTIA, UNSPECIFIED DEMENTIA SEVERITY, UNSPECIFIED WHETHER BEHAVIORAL, PSYCHOTIC, OR MOOD DISTURBANCE OR ANXIETY (HCC): Primary | ICD-10-CM

## 2024-06-07 DIAGNOSIS — F02.80 LEWY BODY DEMENTIA, UNSPECIFIED DEMENTIA SEVERITY, UNSPECIFIED WHETHER BEHAVIORAL, PSYCHOTIC, OR MOOD DISTURBANCE OR ANXIETY (HCC): Primary | ICD-10-CM

## 2024-06-07 NOTE — PROGRESS NOTES
[facility]  Neurology Clinic  [facility]    OFFICE PROCEDURE NOTE: SYN-ONE Skin Punch Biopsy          PERFORMING PROVIDER: SYED Hays   PROCEDURE:  Skin Punch Biopsy    CPT Code: 46749, 11105 X 2      ICD-10-CM    1. Lewy body dementia, unspecified dementia severity, unspecified whether behavioral, psychotic, or mood disturbance or anxiety ([facility])  G31.83     F02.80           Medications/ Supplies:   Skin-punch biopsy kit from [employer]: skin punch biopsy tool, povidone/ iodine prep pad, alcohol prep pad   [x] 0.5%  []1%    []Lidocaine  [x] bupivacaine     []with epinephrine  3 mL syringe, 30G 1/2\" needle    The procedure and potential complications were explained to the patient, and verbal consent was obtained.  The skin was cleansed with the betadine swabs over the:    [x]Right [] Left -  Posterior cervical: 3 cm lateral from C7 vertebrae    [x]Right []Left  - Distal thigh: 10 cm above lateral knee     [x]Right []Left  - Distal leg: 10 cm above lateral malleolus          The perimeter of the cleansed areas was infiltrated with lidocaine solution.  A skin punch biopsy was performed at the distal site with the provided biopsy tool, and the biopsy was placed into container labeled distal sample.  The process was repeated at the proximal site and the biopsy was placed into the container labeled proximal sample.  The containers were appropriately labeled with the patient names, lids tightened and placed on ice in the supplied styrofoam box and sent by [employer] to the lab.    The biopsy sites were cleaned with alcohol swabs x 2 then bandages applied.      There were no immediate or obvious complications and the patient did not complain of pain afterwards.         ___________________  SYED Hays

## 2024-06-07 NOTE — PATIENT INSTRUCTIONS
As a reminder:   Please come to your appointment 15 minutes before your office appointment.  This way, you can get checked in at the  and checked in by the nursing staff so you have the full allotment of time with your provider for your visit.  Please bring an up-to-date and accurate list of all your medications.  Or bring all your active prescription bottles with you at the time of your office visit and this includes over-the-counter medications so we can make sure that your medication list is up-to-date.  If you are scheduled for a virtual visit, please be aware that the  will need to check you in and usually the day before to verify insurance and collect co-pays as appropriate.  Please be prepared for the second call which will be from the nurse to go over your medications and any other vital information.  This will probably be done 30 minutes prior to your visit.  The reason why we do this early is that you can get the full benefit of your appointment time with your provider.  Finally you will be given the link for your virtual visit please click into your link 10 minutes prior to your appointment and please wait patiently for the provider to join you        As per discussion  Status post skin biopsy  Keep Band-Aids on for 24 hours  We recommend not showering or taking a bath for 24 hours  After 24 hours you can take the Band-Aid off but cover the biopsy sites for about 3 days with Vaseline to help with wound healing  Monitor the site for signs and symptoms of infection which would include swelling, redness, thick discharge or drainage but is not clear  If you suspect infection I would recommend going to the local urgent care or primary care for evaluation and determination of need for antibiotics                   Office Policies    Phone calls/patient messages:  Please allow up to 72 hours  for someone in the office to contact you about your call or message. Be mindful your provider may be

## 2024-12-03 ENCOUNTER — OFFICE VISIT (OUTPATIENT)
Age: 74
End: 2024-12-03
Payer: MEDICARE

## 2024-12-03 VITALS
DIASTOLIC BLOOD PRESSURE: 72 MMHG | WEIGHT: 158.6 LBS | OXYGEN SATURATION: 99 % | TEMPERATURE: 97.9 F | SYSTOLIC BLOOD PRESSURE: 122 MMHG | HEIGHT: 62 IN | BODY MASS INDEX: 29.19 KG/M2 | HEART RATE: 61 BPM | RESPIRATION RATE: 16 BRPM

## 2024-12-03 DIAGNOSIS — F03.A0 MILD DEMENTIA WITHOUT BEHAVIORAL DISTURBANCE, PSYCHOTIC DISTURBANCE, MOOD DISTURBANCE, OR ANXIETY, UNSPECIFIED DEMENTIA TYPE (HCC): Primary | ICD-10-CM

## 2024-12-03 DIAGNOSIS — F32.A ANXIETY AND DEPRESSION: ICD-10-CM

## 2024-12-03 DIAGNOSIS — F41.9 ANXIETY AND DEPRESSION: ICD-10-CM

## 2024-12-03 DIAGNOSIS — R63.4 WEIGHT LOSS, UNINTENTIONAL: ICD-10-CM

## 2024-12-03 PROCEDURE — 1036F TOBACCO NON-USER: CPT | Performed by: PSYCHIATRY & NEUROLOGY

## 2024-12-03 PROCEDURE — 1159F MED LIST DOCD IN RCRD: CPT | Performed by: PSYCHIATRY & NEUROLOGY

## 2024-12-03 PROCEDURE — 99215 OFFICE O/P EST HI 40 MIN: CPT | Performed by: PSYCHIATRY & NEUROLOGY

## 2024-12-03 PROCEDURE — G8484 FLU IMMUNIZE NO ADMIN: HCPCS | Performed by: PSYCHIATRY & NEUROLOGY

## 2024-12-03 PROCEDURE — G8417 CALC BMI ABV UP PARAM F/U: HCPCS | Performed by: PSYCHIATRY & NEUROLOGY

## 2024-12-03 PROCEDURE — G8399 PT W/DXA RESULTS DOCUMENT: HCPCS | Performed by: PSYCHIATRY & NEUROLOGY

## 2024-12-03 PROCEDURE — 1160F RVW MEDS BY RX/DR IN RCRD: CPT | Performed by: PSYCHIATRY & NEUROLOGY

## 2024-12-03 PROCEDURE — 1090F PRES/ABSN URINE INCON ASSESS: CPT | Performed by: PSYCHIATRY & NEUROLOGY

## 2024-12-03 PROCEDURE — 1126F AMNT PAIN NOTED NONE PRSNT: CPT | Performed by: PSYCHIATRY & NEUROLOGY

## 2024-12-03 PROCEDURE — 1123F ACP DISCUSS/DSCN MKR DOCD: CPT | Performed by: PSYCHIATRY & NEUROLOGY

## 2024-12-03 PROCEDURE — G8427 DOCREV CUR MEDS BY ELIG CLIN: HCPCS | Performed by: PSYCHIATRY & NEUROLOGY

## 2024-12-03 ASSESSMENT — PATIENT HEALTH QUESTIONNAIRE - PHQ9
SUM OF ALL RESPONSES TO PHQ QUESTIONS 1-9: 0
SUM OF ALL RESPONSES TO PHQ QUESTIONS 1-9: 0
2. FEELING DOWN, DEPRESSED OR HOPELESS: NOT AT ALL
1. LITTLE INTEREST OR PLEASURE IN DOING THINGS: NOT AT ALL
SUM OF ALL RESPONSES TO PHQ QUESTIONS 1-9: 0
SUM OF ALL RESPONSES TO PHQ QUESTIONS 1-9: 0
SUM OF ALL RESPONSES TO PHQ9 QUESTIONS 1 & 2: 0

## 2024-12-03 NOTE — PATIENT INSTRUCTIONS
Physical exam: Lungs and heart were examined.  - Vital signs: Weight is 158 pounds.  - Laboratory results: Skin biopsy in June 2024 was negative for synucleinopathy. Blood work in January showed normal B12 and B6 levels.    Diagnosis:  - Alzheimer's dementia    Treatment Plan:  - Continue current medications:    - Donepezil 10 mg daily    - Memantine 10 mg twice daily    - Wellbutrin    - BuSpar    - Ambien (low dose)  - No changes to the current treatment plan necessary at this time.    Follow-Up Instructions:  - Continue taking medications as prescribed.  - Family to assist with medication management, including filling the pill box and reminding Lelo to take her medications.  - Family to handle grocery shopping and meal preparation, although Lelo can prepare a quick snack independently.  - Maintain current lifestyle and activities, including watching television, reading books, and going to the movies.  - No driving.    Additional Notes:  - Lelo's mood is stable with no significant agitation and she is sleeping well at night.  - She remains independent in activities of daily living such as dressing, bathing, brushing her teeth, and putting on her glasses.  - No wandering behavior reported.  - Family is satisfied with the current management and has no additional questions or concerns.              Office Policies    Phone calls/patient messages:  Please allow up to 72 hours  for someone in the office to contact you about your call or message. Be mindful your provider may be out of the office or your message may require further review. We encourage you to use [employer] for your messages as this is a faster, more efficient way to communicate with our office    Medication Refills:  Prescription medications require up to 48 business hours to process. We encourage you to use [employer] for your refills.     For controlled medications: Please allow up to 72 business hours to process. Certain medications may require you to pick

## 2024-12-03 NOTE — ASSESSMENT & PLAN NOTE
Well-controlled on Wellbutrin and BuSpar she is to continue on this.  Presently no evidence of agitation at this time would not recommend any changes

## 2024-12-03 NOTE — ASSESSMENT & PLAN NOTE
Overall stable continue on donepezil and Namenda  Lewy body dementia has been ruled out via Syn-one Skin Bx biopsy completed in June 2024    MRI scan of the brain only shows mild chronic microvascular ischemic changes no acute process this was completed February 2024    Patient assessed for possible alcohol related dementia.  Patient does not drink alcohol and never did drink significant alcohol per daughter's report.  Thiamine level well within normal limits    Neuropsych testing pending might consider PET scan based on those results

## 2024-12-03 NOTE — ASSESSMENT & PLAN NOTE
Wt Readings from Last 3 Encounters:   12/03/24 71.9 kg (158 lb 9.6 oz)   05/15/24 74.1 kg (163 lb 6.4 oz)   03/12/24 72.8 kg (160 lb 7.9 oz)      Patient hovering around the same weight we will continue to monitor over time

## 2024-12-03 NOTE — PROGRESS NOTES
[facility]  [facility]  [address]  [place] [address]  Phone: [phone] fax: [phone]        Lelo Kaiser is a 74 y.o. female who presents today for the following:  Chief Complaint   Patient presents with    Follow-up     Patient is here for memory and patient states that sometimes her head is a tad bit off.  States that she also thinks it could be her glasses as things are not quite in focus.      ASSESSMENT AND PLAN    1. Mild dementia without behavioral disturbance, psychotic disturbance, mood disturbance, or anxiety, unspecified dementia type (HCC)  Assessment & Plan:  Overall stable continue on donepezil and Namenda  Lewy body dementia has been ruled out via Syn-one Skin Bx biopsy completed in June 2024    MRI scan of the brain only shows mild chronic microvascular ischemic changes no acute process this was completed February 2024    Patient assessed for possible alcohol related dementia.  Patient does not drink alcohol and never did drink significant alcohol per daughter's report.  Thiamine level well within normal limits    Neuropsych testing pending might consider PET scan based on those results  2. Weight loss, unintentional  Assessment & Plan:  Wt Readings from Last 3 Encounters:   12/03/24 71.9 kg (158 lb 9.6 oz)   05/15/24 74.1 kg (163 lb 6.4 oz)   03/12/24 72.8 kg (160 lb 7.9 oz)      Patient hovering around the same weight we will continue to monitor over time   3. Anxiety and depression  Assessment & Plan:  Well-controlled on Wellbutrin and BuSpar she is to continue on this.  Presently no evidence of agitation at this time would not recommend any changes        Return in about 6 months (around 6/3/2025) for In office.     Patient and/or family was given time to ask questions and voice concerns. I believe all questions concerns were adequately addressed at this  office visit.  Patient and/or family also verbalized agreement

## 2025-03-03 ENCOUNTER — TELEPHONE (OUTPATIENT)
Age: 75
End: 2025-03-03

## 2025-03-03 NOTE — TELEPHONE ENCOUNTER
Patient's daughter, Leticia, called to r/s 3/7 appts due to death in family ( is on 3/7). Please call [phone]

## 2025-03-20 NOTE — PROGRESS NOTES
dementia type (HCC)            PLAN:  Patient will participate in comprehensive evaluation in order to obtain a quantitative assessment of their cognitive and emotional functioning  Differential diagnosis and treatment planning will be based upon results from clinical interview and objective testing  Patient will be provided with review of results, impressions, and recommendations during feedback session  Patient will be encouraged to follow-up with referring provider for ongoing management    TIME DOCUMENTATION:  Clinical Interview: 1100 - 1120 = 20 minutes    BILLING:  [id number]

## 2025-03-21 ENCOUNTER — PROCEDURE VISIT (OUTPATIENT)
Age: 75
End: 2025-03-21
Payer: MEDICARE

## 2025-03-21 ENCOUNTER — OFFICE VISIT (OUTPATIENT)
Age: 75
End: 2025-03-21

## 2025-03-21 DIAGNOSIS — F02.B3 MODERATE LATE ONSET ALZHEIMER'S DEMENTIA WITH MOOD DISTURBANCE (HCC): Primary | ICD-10-CM

## 2025-03-21 DIAGNOSIS — F03.A0 MILD DEMENTIA WITHOUT BEHAVIORAL DISTURBANCE, PSYCHOTIC DISTURBANCE, MOOD DISTURBANCE, OR ANXIETY, UNSPECIFIED DEMENTIA TYPE (HCC): Primary | ICD-10-CM

## 2025-03-21 DIAGNOSIS — G30.1 MODERATE LATE ONSET ALZHEIMER'S DEMENTIA WITH MOOD DISTURBANCE (HCC): Primary | ICD-10-CM

## 2025-03-21 PROCEDURE — 96138 PSYCL/NRPSYC TECH 1ST: CPT | Performed by: CLINICAL NEUROPSYCHOLOGIST

## 2025-03-21 PROCEDURE — 96133 NRPSYC TST EVAL PHYS/QHP EA: CPT | Performed by: CLINICAL NEUROPSYCHOLOGIST

## 2025-03-21 PROCEDURE — 96132 NRPSYC TST EVAL PHYS/QHP 1ST: CPT | Performed by: CLINICAL NEUROPSYCHOLOGIST

## 2025-03-21 PROCEDURE — 96139 PSYCL/NRPSYC TST TECH EA: CPT | Performed by: CLINICAL NEUROPSYCHOLOGIST

## 2025-05-02 NOTE — PROGRESS NOTES
Neuropsychological Evaluation Report      Patient Name: Lelo Kaiser  YOB: 1950    Age: 75 y.o.  Date of Intake: 3/21/2025   Education: 12 Date of Testing: 3/21/2025   Gender: Female Ethnicity: White     Referring Provider: Dr. Nunez     REASON FOR REFERRAL AND EVALUATION PROCEDURES:  Lelo Kaiser  was referred for neuropsychological evaluation by her Primary Care Physician to obtain a quantitative assessment of her current level of neurocognitive functioning, assist in differential diagnosis, and aid in individualized treatment planning. The patient understood the rationale and procedures for evaluation, as well as the limits to confidentiality, and they agreed to participate. The patient consented to have this report made available to her  treating providers through her  electronic medical records. This evaluation was completed with the patient by Lance Lopez PsyD with the exception of testing by technician, which was completed by KATHERINE Alexander under the supervision of Dr. Lopez.  History Sources: Patient, Relative (daughter), Medical Record, and Test Data    SUMMARY AND IMPRESSION:  The following section is a summary of the patient's pertinent test results and impressions. A more thorough review of the patient's background and test scores can be found below.    Results from the patient's neuropsychological evaluation revealed global moderate impairment.  Brief assessment of psychopathology revealed mild symptoms of depression.    Compared to the patient's previous evaluation, significant declines were demonstrated across measures of processing speed, executive functioning (response inhibition), language (letter fluency), learning (list learning and basic figure learning), and recognition (story recognition, basic figure recognition, and progressively complex figure recognition).    Global moderate impairment is is not reflective of localized dysfunction and it is not specific to any

## 2025-05-08 ENCOUNTER — OFFICE VISIT (OUTPATIENT)
Age: 75
End: 2025-05-08
Payer: MEDICARE

## 2025-05-08 DIAGNOSIS — G30.1 MODERATE LATE ONSET ALZHEIMER'S DEMENTIA WITH MOOD DISTURBANCE (HCC): Primary | ICD-10-CM

## 2025-05-08 DIAGNOSIS — F02.B3 MODERATE LATE ONSET ALZHEIMER'S DEMENTIA WITH MOOD DISTURBANCE (HCC): Primary | ICD-10-CM

## 2025-05-08 PROCEDURE — 96132 NRPSYC TST EVAL PHYS/QHP 1ST: CPT | Performed by: CLINICAL NEUROPSYCHOLOGIST

## 2025-05-08 NOTE — PROGRESS NOTES
Chief complaint: Patient presented for review of previously completed neuropsychological evaluation and discussion of impressions/recommendations.    Prior to seeing the patient for today's visit, I reviewed pertinent records, including the previously completed report, the records in Epic, and any updated visits from other providers since the patient's last visit.    I provided feedback services related to the previously completed report. Attendees included: Patient and Children. Education was provided regarding my diagnostic impressions, and we discussed treatment plan/options. Attendees were provided with the opportunity to ask questions, which were answered to the best of my ability.    We discussed, in detail, the following:  Reviewed findings from evaluation including test results, diagnosis, and suspected contributing factors  Discussed recommendations outlined in report  Answered questions to the best of my ability    The patient needs to:   Follow up with referring provider for ongoing management, Use practical strategies to compensate for cognitive weaknesses (See handout), Emphasize modifiable risk and protective factors for cognitive functioning (e.g., exercise, diet, cognitive stimulation; see handout), and Access community resources we discussed for additional support (See handout)    The patient had the following reactions to recommendations: Patient and daughter reported understanding results and recommendations      ASSESSMENT:   Diagnosis Orders   1. Moderate late onset Alzheimer's dementia with mood disturbance (HCC)            TIME SPENT PROVIDING SERVICES:   31 minutes     BILLING:  96132 x 1 Units    *Code 93982 Neuropsychological testing evaluation services include: Integration of patient data, interpretation of standardized test results and clinical data, clinical decision-making, treatment planning and report, and interactive feedback to the patient, family member(s) or caregiver(s), when

## 2025-07-17 ENCOUNTER — OFFICE VISIT (OUTPATIENT)
Age: 75
End: 2025-07-17
Payer: MEDICARE

## 2025-07-17 VITALS
SYSTOLIC BLOOD PRESSURE: 132 MMHG | WEIGHT: 156 LBS | RESPIRATION RATE: 15 BRPM | HEART RATE: 70 BPM | OXYGEN SATURATION: 98 % | HEIGHT: 62 IN | DIASTOLIC BLOOD PRESSURE: 68 MMHG | BODY MASS INDEX: 28.71 KG/M2

## 2025-07-17 DIAGNOSIS — F02.A0 MILD LATE ONSET ALZHEIMER'S DEMENTIA WITHOUT BEHAVIORAL DISTURBANCE, PSYCHOTIC DISTURBANCE, MOOD DISTURBANCE, OR ANXIETY (HCC): Primary | ICD-10-CM

## 2025-07-17 DIAGNOSIS — G30.1 MILD LATE ONSET ALZHEIMER'S DEMENTIA WITHOUT BEHAVIORAL DISTURBANCE, PSYCHOTIC DISTURBANCE, MOOD DISTURBANCE, OR ANXIETY (HCC): Primary | ICD-10-CM

## 2025-07-17 DIAGNOSIS — F32.A ANXIETY AND DEPRESSION: ICD-10-CM

## 2025-07-17 DIAGNOSIS — R63.4 WEIGHT LOSS, UNINTENTIONAL: ICD-10-CM

## 2025-07-17 DIAGNOSIS — F41.9 ANXIETY AND DEPRESSION: ICD-10-CM

## 2025-07-17 PROCEDURE — 1123F ACP DISCUSS/DSCN MKR DOCD: CPT | Performed by: PSYCHIATRY & NEUROLOGY

## 2025-07-17 PROCEDURE — 1159F MED LIST DOCD IN RCRD: CPT | Performed by: PSYCHIATRY & NEUROLOGY

## 2025-07-17 PROCEDURE — G2211 COMPLEX E/M VISIT ADD ON: HCPCS | Performed by: PSYCHIATRY & NEUROLOGY

## 2025-07-17 PROCEDURE — 1160F RVW MEDS BY RX/DR IN RCRD: CPT | Performed by: PSYCHIATRY & NEUROLOGY

## 2025-07-17 PROCEDURE — G8427 DOCREV CUR MEDS BY ELIG CLIN: HCPCS | Performed by: PSYCHIATRY & NEUROLOGY

## 2025-07-17 PROCEDURE — 99215 OFFICE O/P EST HI 40 MIN: CPT | Performed by: PSYCHIATRY & NEUROLOGY

## 2025-07-17 PROCEDURE — 1126F AMNT PAIN NOTED NONE PRSNT: CPT | Performed by: PSYCHIATRY & NEUROLOGY

## 2025-07-17 PROCEDURE — G8417 CALC BMI ABV UP PARAM F/U: HCPCS | Performed by: PSYCHIATRY & NEUROLOGY

## 2025-07-17 PROCEDURE — 1036F TOBACCO NON-USER: CPT | Performed by: PSYCHIATRY & NEUROLOGY

## 2025-07-17 PROCEDURE — G8399 PT W/DXA RESULTS DOCUMENT: HCPCS | Performed by: PSYCHIATRY & NEUROLOGY

## 2025-07-17 PROCEDURE — 1090F PRES/ABSN URINE INCON ASSESS: CPT | Performed by: PSYCHIATRY & NEUROLOGY

## 2025-07-17 ASSESSMENT — PATIENT HEALTH QUESTIONNAIRE - PHQ9
SUM OF ALL RESPONSES TO PHQ QUESTIONS 1-9: 1
1. LITTLE INTEREST OR PLEASURE IN DOING THINGS: NOT AT ALL
2. FEELING DOWN, DEPRESSED OR HOPELESS: SEVERAL DAYS
SUM OF ALL RESPONSES TO PHQ QUESTIONS 1-9: 1

## 2025-07-17 NOTE — PROGRESS NOTES
1. Have you been to the ER, urgent care clinic since your last visit?  Hospitalized since your last visit?  No.    2. Have you seen or consulted any other health care providers outside of the [facility] since your last visit?  Include any pap smears or colon screening.   No.      Chief Complaint   Patient presents with    Dementia     Pt denies any symptoms

## 2025-07-17 NOTE — ASSESSMENT & PLAN NOTE
Wt Readings from Last 3 Encounters:   07/17/25 70.8 kg (156 lb)   12/03/24 71.9 kg (158 lb 9.6 oz)   05/15/24 74.1 kg (163 lb 6.4 oz)     Slowly decrease regarding her weight but the daughter actually feels it is because she is not going out on her excursions that she and her cousin would go do regarding going out to eat or going to the grocery store and getting lots of snacks and \"goodies\".  Neither of them are driving presently.   The daughter does attest to the patient having a good appetite and eats well she is just not snacking like she used to do.         Her weight at this point although decreasing for seemingly appropriate reasons is certainly within not unhealthy at this time

## 2025-07-17 NOTE — PATIENT INSTRUCTIONS
As per discussion  I definitely want you to start getting some exercise.  You need to start walking more your brain likes movement and your brain likes exercise    I also want to not only to do some physical exercise but I want you to do some brain activity exercises such as word search crossword puzzles paining putting together scrap books etc.    It is really important to exercise your brain as well as your body for brain health    I would also recommend perhaps going to a community center once or twice a week to get outside stimulation and socialization.    Will see you back in the office in March of next year however if you need to be seen sooner please reach out to us so we can see what is going on and whether we need to bring you in sooner            As a reminder:   Please come to your appointment 15 minutes before your office appointment.  This way, you can get checked in at the  and checked in by the nursing staff so you have the full allotment of time with your provider for your visit.  Please bring an up-to-date and accurate list of all your medications.  Or bring all your active prescription bottles with you at the time of your office visit and this includes over-the-counter medications so we can make sure that your medication list is up-to-date.  If you are scheduled for a virtual visit, please be aware that the  will need to check you in and usually the day before to verify insurance and collect co-pays as appropriate.  Please be prepared for the second call which will be from the nurse to go over your medications and any other vital information.  This will probably be done 30 minutes prior to your visit.  The reason why we do this early is that you can get the full benefit of your appointment time with your provider.  Finally you will be given the link for your virtual visit please click into your link 10 minutes prior to your appointment and please wait patiently for the

## 2025-07-17 NOTE — PROGRESS NOTES
[facility]  [facility]  [address]  [place] [address]  Phone: [phone] fax: [phone]        Lelo Kaiser is a 75 y.o. female who presents today for the following:  Chief Complaint   Patient presents with    Dementia     Pt denies any symptoms      ASSESSMENT AND PLAN    1. Mild late onset Alzheimer's dementia without behavioral disturbance, psychotic disturbance, mood disturbance, or anxiety (HCC)  Assessment & Plan:  Seems to be progressing some.  That would be consistent with neurodegenerative process.  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    She is no longer driving and neither is her cousin.  So they are not getting out on \"excursions \"  She is not doing much to stay physically active and she is not doing much in terms of actual mental exercise.    Discussed the importance of both physical and mental exercise to promote brain health.  We talked about some activities for mental exercise including puzzles books perhaps doing some scrapbooking and putting photographs and then album    Physical exercise I have recommended walking when weather is appropriate    I have also suggested to the daughter to look for some community centers for senior centers that will take dementia patients for stimulation both physical and mental.      2. Weight loss, unintentional  Assessment & Plan:  Wt Readings from Last 3 Encounters:   07/17/25 70.8 kg (156 lb)   12/03/24 71.9 kg (158 lb 9.6 oz)   05/15/24 74.1 kg (163 lb 6.4 oz)     Slowly decrease regarding her weight but the daughter actually feels it is because she is not going out on her

## 2025-07-17 NOTE — ASSESSMENT & PLAN NOTE
Seems to be progressing some.  That would be consistent with neurodegenerative process.  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    She is no longer driving and neither is her cousin.  So they are not getting out on \"excursions \"  She is not doing much to stay physically active and she is not doing much in terms of actual mental exercise.    Discussed the importance of both physical and mental exercise to promote brain health.  We talked about some activities for mental exercise including puzzles books perhaps doing some scrapbooking and putting photographs and then album    Physical exercise I have recommended walking when weather is appropriate    I have also suggested to the daughter to look for some community centers for senior centers that will take dementia patients for stimulation both physical and mental.